# Patient Record
Sex: FEMALE | Race: BLACK OR AFRICAN AMERICAN | NOT HISPANIC OR LATINO | URBAN - METROPOLITAN AREA
[De-identification: names, ages, dates, MRNs, and addresses within clinical notes are randomized per-mention and may not be internally consistent; named-entity substitution may affect disease eponyms.]

---

## 2018-12-11 ENCOUNTER — HISTORICAL (OUTPATIENT)
Dept: RADIOLOGY | Facility: HOSPITAL | Age: 33
End: 2018-12-11

## 2019-03-06 ENCOUNTER — HISTORICAL (OUTPATIENT)
Dept: INTERNAL MEDICINE | Facility: CLINIC | Age: 34
End: 2019-03-06

## 2019-03-06 LAB
ABS NEUT (OLG): 2.89 X10(3)/MCL (ref 2.1–9.2)
ALBUMIN SERPL-MCNC: 4.5 GM/DL (ref 3.4–5)
ALBUMIN/GLOB SERPL: 1.4 RATIO (ref 1.1–2)
ALP SERPL-CCNC: 121 UNIT/L (ref 45–117)
ALT SERPL-CCNC: 17 UNIT/L (ref 12–78)
APPEARANCE, UA: ABNORMAL
AST SERPL-CCNC: 10 UNIT/L (ref 15–37)
B-HCG SERPL QL: NEGATIVE
BACTERIA #/AREA URNS AUTO: ABNORMAL /[HPF]
BASOPHILS # BLD AUTO: 0.01 X10(3)/MCL
BASOPHILS NFR BLD AUTO: 0 %
BILIRUB SERPL-MCNC: 0.7 MG/DL (ref 0.2–1)
BILIRUB UR QL STRIP: NEGATIVE
BILIRUBIN DIRECT+TOT PNL SERPL-MCNC: 0.2 MG/DL
BILIRUBIN DIRECT+TOT PNL SERPL-MCNC: 0.5 MG/DL
BUN SERPL-MCNC: 11 MG/DL (ref 7–18)
CALCIUM SERPL-MCNC: 9.2 MG/DL (ref 8.5–10.1)
CHLORIDE SERPL-SCNC: 102 MMOL/L (ref 98–107)
CO2 SERPL-SCNC: 30 MMOL/L (ref 21–32)
COLOR UR: YELLOW
CREAT SERPL-MCNC: 0.7 MG/DL (ref 0.6–1.3)
EOSINOPHIL # BLD AUTO: 0.13 X10(3)/MCL
EOSINOPHIL NFR BLD AUTO: 2 %
ERYTHROCYTE [DISTWIDTH] IN BLOOD BY AUTOMATED COUNT: 12.8 % (ref 11.5–14.5)
GLOBULIN SER-MCNC: 3.2 GM/ML (ref 2.3–3.5)
GLUCOSE (UA): NORMAL
GLUCOSE SERPL-MCNC: 92 MG/DL (ref 74–106)
HCT VFR BLD AUTO: 35.2 % (ref 35–46)
HGB BLD-MCNC: 11.5 GM/DL (ref 12–16)
HGB UR QL STRIP: NEGATIVE
HYALINE CASTS #/AREA URNS LPF: ABNORMAL /[LPF]
IMM GRANULOCYTES # BLD AUTO: 0.01 10*3/UL
IMM GRANULOCYTES NFR BLD AUTO: 0 %
KETONES UR QL STRIP: NEGATIVE
LEUKOCYTE ESTERASE UR QL STRIP: NEGATIVE
LYMPHOCYTES # BLD AUTO: 3.34 X10(3)/MCL
LYMPHOCYTES NFR BLD AUTO: 48 % (ref 13–40)
MCH RBC QN AUTO: 29.5 PG (ref 26–34)
MCHC RBC AUTO-ENTMCNC: 32.7 GM/DL (ref 31–37)
MCV RBC AUTO: 90.3 FL (ref 80–100)
MONOCYTES # BLD AUTO: 0.61 X10(3)/MCL
MONOCYTES NFR BLD AUTO: 9 % (ref 4–12)
NEUTROPHILS # BLD AUTO: 2.89 X10(3)/MCL
NEUTROPHILS NFR BLD AUTO: 41 X10(3)/MCL
NITRITE UR QL STRIP: NEGATIVE
PH UR STRIP: 7.5 [PH] (ref 4.5–8)
PLATELET # BLD AUTO: 373 X10(3)/MCL (ref 130–400)
PMV BLD AUTO: 9.9 FL (ref 7.4–10.4)
POTASSIUM SERPL-SCNC: 3.3 MMOL/L (ref 3.5–5.1)
PROT SERPL-MCNC: 7.7 GM/DL (ref 6.4–8.2)
PROT UR QL STRIP: 10 MG/DL
RBC # BLD AUTO: 3.9 X10(6)/MCL (ref 4–5.2)
RBC #/AREA URNS AUTO: ABNORMAL /[HPF]
SODIUM SERPL-SCNC: 139 MMOL/L (ref 136–145)
SP GR UR STRIP: 1.02 (ref 1–1.03)
SQUAMOUS #/AREA URNS LPF: ABNORMAL /[LPF]
T4 FREE SERPL-MCNC: 0.85 NG/DL (ref 0.76–1.46)
T4 SERPL-MCNC: 8.1 MCG/DL (ref 4.8–13.9)
TSH SERPL-ACNC: 0.64 MIU/L (ref 0.36–3.74)
UROBILINOGEN UR STRIP-ACNC: NORMAL
WBC # SPEC AUTO: 7 X10(3)/MCL (ref 4.5–11)
WBC #/AREA URNS AUTO: ABNORMAL /HPF

## 2019-09-17 ENCOUNTER — HISTORICAL (OUTPATIENT)
Dept: ADMINISTRATIVE | Facility: HOSPITAL | Age: 34
End: 2019-09-17

## 2019-09-17 LAB
ALBUMIN SERPL-MCNC: 4.5 GM/DL (ref 3.4–5)
ALBUMIN/GLOB SERPL: 1.2 RATIO (ref 1.1–2)
ALP SERPL-CCNC: 89 UNIT/L (ref 45–117)
ALT SERPL-CCNC: 25 UNIT/L (ref 12–78)
AST SERPL-CCNC: 17 UNIT/L (ref 15–37)
BILIRUB SERPL-MCNC: 0.8 MG/DL (ref 0.2–1)
BILIRUBIN DIRECT+TOT PNL SERPL-MCNC: 0.2 MG/DL (ref 0–0.2)
BILIRUBIN DIRECT+TOT PNL SERPL-MCNC: 0.6 MG/DL
BUN SERPL-MCNC: 18 MG/DL (ref 7–18)
CALCIUM SERPL-MCNC: 8.9 MG/DL (ref 8.5–10.1)
CHLORIDE SERPL-SCNC: 103 MMOL/L (ref 98–107)
CO2 SERPL-SCNC: 26 MMOL/L (ref 21–32)
CREAT SERPL-MCNC: 0.9 MG/DL (ref 0.6–1.3)
GLOBULIN SER-MCNC: 3.7 GM/ML (ref 2.3–3.5)
GLUCOSE SERPL-MCNC: 86 MG/DL (ref 74–106)
HAV IGM SERPL QL IA: NONREACTIVE
HBV CORE IGM SERPL QL IA: REACTIVE
HBV SURFACE AG SERPL QL IA: NEGATIVE
HCV AB SERPL QL IA: NONREACTIVE
HIV 1+2 AB+HIV1 P24 AG SERPL QL IA: NONREACTIVE
POTASSIUM SERPL-SCNC: 3 MMOL/L (ref 3.5–5.1)
PROT SERPL-MCNC: 8.2 GM/DL (ref 6.4–8.2)
SODIUM SERPL-SCNC: 138 MMOL/L (ref 136–145)
T PALLIDUM AB SER QL: NONREACTIVE

## 2019-12-06 ENCOUNTER — HISTORICAL (OUTPATIENT)
Dept: ADMINISTRATIVE | Facility: HOSPITAL | Age: 34
End: 2019-12-06

## 2019-12-09 LAB — FINAL CULTURE: NORMAL

## 2020-02-15 ENCOUNTER — HISTORICAL (OUTPATIENT)
Dept: ADMINISTRATIVE | Facility: HOSPITAL | Age: 35
End: 2020-02-15

## 2020-02-17 LAB — FINAL CULTURE: NORMAL

## 2020-07-29 ENCOUNTER — HISTORICAL (OUTPATIENT)
Dept: INTERNAL MEDICINE | Facility: CLINIC | Age: 35
End: 2020-07-29

## 2020-07-29 LAB
ABS NEUT (OLG): 3.65 X10(3)/MCL (ref 2.1–9.2)
ALBUMIN SERPL-MCNC: 4 GM/DL (ref 3.4–5)
ALBUMIN/GLOB SERPL: 1 RATIO (ref 1.1–2)
ALP SERPL-CCNC: 103 UNIT/L (ref 45–117)
ALT SERPL-CCNC: 44 UNIT/L (ref 12–78)
AST SERPL-CCNC: 18 UNIT/L (ref 15–37)
BASOPHILS # BLD AUTO: 0 X10(3)/MCL (ref 0–0.2)
BASOPHILS NFR BLD AUTO: 0 %
BILIRUB SERPL-MCNC: 0.4 MG/DL (ref 0.2–1)
BILIRUBIN DIRECT+TOT PNL SERPL-MCNC: 0.2 MG/DL
BILIRUBIN DIRECT+TOT PNL SERPL-MCNC: 0.2 MG/DL (ref 0–0.2)
BUN SERPL-MCNC: 12 MG/DL (ref 7–18)
CALCIUM SERPL-MCNC: 8.5 MG/DL (ref 8.5–10.1)
CHLORIDE SERPL-SCNC: 105 MMOL/L (ref 98–107)
CHOLEST SERPL-MCNC: 190 MG/DL
CHOLEST/HDLC SERPL: 2.7 {RATIO} (ref 0–4.4)
CO2 SERPL-SCNC: 25 MMOL/L (ref 21–32)
CREAT SERPL-MCNC: 0.8 MG/DL (ref 0.6–1.3)
DEPRECATED CALCIDIOL+CALCIFEROL SERPL-MC: 21.6 NG/ML (ref 30–80)
EOSINOPHIL # BLD AUTO: 0.1 X10(3)/MCL (ref 0–0.9)
EOSINOPHIL NFR BLD AUTO: 2 %
ERYTHROCYTE [DISTWIDTH] IN BLOOD BY AUTOMATED COUNT: 13 % (ref 11.5–14.5)
EST. AVERAGE GLUCOSE BLD GHB EST-MCNC: 103 MG/DL
FERRITIN SERPL-MCNC: 18.7 NG/ML (ref 10–150)
GLOBULIN SER-MCNC: 4 GM/ML (ref 2.3–3.5)
GLUCOSE SERPL-MCNC: 107 MG/DL (ref 74–106)
HBA1C MFR BLD: 5.2 % (ref 4.2–6.3)
HCT VFR BLD AUTO: 35.4 % (ref 35–46)
HDLC SERPL-MCNC: 70 MG/DL (ref 40–59)
HGB BLD-MCNC: 11.7 GM/DL (ref 12–16)
IMM GRANULOCYTES # BLD AUTO: 0.02 10*3/UL
IMM GRANULOCYTES NFR BLD AUTO: 0 %
IRON SATN MFR SERPL: 11.9 % (ref 15–50)
IRON SERPL-MCNC: 51 MCG/DL (ref 50–170)
LDLC SERPL CALC-MCNC: 94 MG/DL
LYMPHOCYTES # BLD AUTO: 3 X10(3)/MCL (ref 0.6–4.6)
LYMPHOCYTES NFR BLD AUTO: 40 %
MCH RBC QN AUTO: 29.7 PG (ref 26–34)
MCHC RBC AUTO-ENTMCNC: 33.1 GM/DL (ref 31–37)
MCV RBC AUTO: 89.8 FL (ref 80–100)
MONOCYTES # BLD AUTO: 0.6 X10(3)/MCL (ref 0.1–1.3)
MONOCYTES NFR BLD AUTO: 8 %
NEUTROPHILS # BLD AUTO: 3.65 X10(3)/MCL (ref 2.1–9.2)
NEUTROPHILS NFR BLD AUTO: 49 %
PLATELET # BLD AUTO: 382 X10(3)/MCL (ref 130–400)
PMV BLD AUTO: 10.4 FL (ref 7.4–10.4)
POTASSIUM SERPL-SCNC: 3.1 MMOL/L (ref 3.5–5.1)
PROT SERPL-MCNC: 8 GM/DL (ref 6.4–8.2)
RBC # BLD AUTO: 3.94 X10(6)/MCL (ref 4–5.2)
SODIUM SERPL-SCNC: 136 MMOL/L (ref 136–145)
T4 FREE SERPL-MCNC: 0.88 NG/DL (ref 0.76–1.46)
TIBC SERPL-MCNC: 429 MCG/DL (ref 250–450)
TRANSFERRIN SERPL-MCNC: 328 MG/DL (ref 200–360)
TRIGL SERPL-MCNC: 129 MG/DL
TSH SERPL-ACNC: 1.37 MIU/L (ref 0.36–3.74)
VLDLC SERPL CALC-MCNC: 26 MG/DL
WBC # SPEC AUTO: 7.4 X10(3)/MCL (ref 4.5–11)

## 2020-11-05 ENCOUNTER — HISTORICAL (OUTPATIENT)
Dept: ADMINISTRATIVE | Facility: HOSPITAL | Age: 35
End: 2020-11-05

## 2020-11-05 LAB
HAV IGM SERPL QL IA: NONREACTIVE
HBV CORE IGM SERPL QL IA: NONREACTIVE
HBV SURFACE AG SERPL QL IA: NONREACTIVE
HCV AB SERPL QL IA: NONREACTIVE
HIV 1+2 AB+HIV1 P24 AG SERPL QL IA: NONREACTIVE
T PALLIDUM AB SER QL: NONREACTIVE

## 2020-11-09 LAB
HUMAN PAPILLOMAVIRUS (HPV): NORMAL
PAP RECOMMENDATION EXT: NORMAL
PAP SMEAR: NORMAL

## 2021-01-29 ENCOUNTER — HISTORICAL (OUTPATIENT)
Dept: INTERNAL MEDICINE | Facility: CLINIC | Age: 36
End: 2021-01-29

## 2021-01-29 LAB
ABS NEUT (OLG): 3.48 X10(3)/MCL (ref 2.1–9.2)
ALBUMIN SERPL-MCNC: 4.1 GM/DL (ref 3.5–5)
ALBUMIN/GLOB SERPL: 1.3 RATIO (ref 1.1–2)
ALP SERPL-CCNC: 97 UNIT/L (ref 40–150)
ALT SERPL-CCNC: 40 UNIT/L (ref 0–55)
AST SERPL-CCNC: 27 UNIT/L (ref 5–34)
BASOPHILS # BLD AUTO: 0 X10(3)/MCL (ref 0–0.2)
BASOPHILS NFR BLD AUTO: 0 %
BILIRUB SERPL-MCNC: 0.7 MG/DL
BILIRUBIN DIRECT+TOT PNL SERPL-MCNC: 0.3 MG/DL (ref 0–0.5)
BILIRUBIN DIRECT+TOT PNL SERPL-MCNC: 0.4 MG/DL (ref 0–0.8)
BUN SERPL-MCNC: 11 MG/DL (ref 7–18.7)
CALCIUM SERPL-MCNC: 8.7 MG/DL (ref 8.4–10.2)
CHLORIDE SERPL-SCNC: 103 MMOL/L (ref 98–107)
CHOLEST SERPL-MCNC: 177 MG/DL
CHOLEST/HDLC SERPL: 3 {RATIO} (ref 0–5)
CO2 SERPL-SCNC: 27 MMOL/L (ref 22–29)
CREAT SERPL-MCNC: 0.75 MG/DL (ref 0.55–1.02)
DEPRECATED CALCIDIOL+CALCIFEROL SERPL-MC: 53.5 NG/ML (ref 30–80)
EOSINOPHIL # BLD AUTO: 0.1 X10(3)/MCL (ref 0–0.9)
EOSINOPHIL NFR BLD AUTO: 2 %
ERYTHROCYTE [DISTWIDTH] IN BLOOD BY AUTOMATED COUNT: 13.6 % (ref 11.5–14.5)
EST. AVERAGE GLUCOSE BLD GHB EST-MCNC: 99.7 MG/DL
GLOBULIN SER-MCNC: 3.2 GM/DL (ref 2.4–3.5)
GLUCOSE SERPL-MCNC: 100 MG/DL (ref 74–100)
HBA1C MFR BLD: 5.1 %
HCT VFR BLD AUTO: 35.7 % (ref 35–46)
HDLC SERPL-MCNC: 70 MG/DL (ref 35–60)
HGB BLD-MCNC: 11.7 GM/DL (ref 12–16)
IMM GRANULOCYTES # BLD AUTO: 0.01 10*3/UL
IMM GRANULOCYTES NFR BLD AUTO: 0 %
LDLC SERPL CALC-MCNC: 89 MG/DL (ref 50–140)
LYMPHOCYTES # BLD AUTO: 2.1 X10(3)/MCL (ref 0.6–4.6)
LYMPHOCYTES NFR BLD AUTO: 33 %
MCH RBC QN AUTO: 30.2 PG (ref 26–34)
MCHC RBC AUTO-ENTMCNC: 32.8 GM/DL (ref 31–37)
MCV RBC AUTO: 92.2 FL (ref 80–100)
MONOCYTES # BLD AUTO: 0.6 X10(3)/MCL (ref 0.1–1.3)
MONOCYTES NFR BLD AUTO: 9 %
NEUTROPHILS # BLD AUTO: 3.48 X10(3)/MCL (ref 2.1–9.2)
NEUTROPHILS NFR BLD AUTO: 55 %
PLATELET # BLD AUTO: 343 X10(3)/MCL (ref 130–400)
PMV BLD AUTO: 9.8 FL (ref 7.4–10.4)
POTASSIUM SERPL-SCNC: 3.6 MMOL/L (ref 3.5–5.1)
PROT SERPL-MCNC: 7.3 GM/DL (ref 6.4–8.3)
RBC # BLD AUTO: 3.87 X10(6)/MCL (ref 4–5.2)
SODIUM SERPL-SCNC: 139 MMOL/L (ref 136–145)
TRIGL SERPL-MCNC: 88 MG/DL (ref 37–140)
TSH SERPL-ACNC: 0.41 UIU/ML (ref 0.35–4.94)
VLDLC SERPL CALC-MCNC: 18 MG/DL
WBC # SPEC AUTO: 6.3 X10(3)/MCL (ref 4.5–11)

## 2021-07-29 ENCOUNTER — HISTORICAL (OUTPATIENT)
Dept: INTERNAL MEDICINE | Facility: CLINIC | Age: 36
End: 2021-07-29

## 2021-07-29 ENCOUNTER — HISTORICAL (OUTPATIENT)
Dept: ADMINISTRATIVE | Facility: HOSPITAL | Age: 36
End: 2021-07-29

## 2021-07-29 LAB
ABS NEUT (OLG): 3.89 X10(3)/MCL (ref 2.1–9.2)
ALBUMIN SERPL-MCNC: 4.1 GM/DL (ref 3.5–5)
ALBUMIN/GLOB SERPL: 1.3 RATIO (ref 1.1–2)
ALP SERPL-CCNC: 87 UNIT/L (ref 40–150)
ALT SERPL-CCNC: 20 UNIT/L (ref 0–55)
APPEARANCE, UA: CLEAR
AST SERPL-CCNC: 16 UNIT/L (ref 5–34)
BACTERIA #/AREA URNS AUTO: ABNORMAL /HPF
BASOPHILS # BLD AUTO: 0 X10(3)/MCL (ref 0–0.2)
BASOPHILS NFR BLD AUTO: 0 %
BILIRUB SERPL-MCNC: 0.4 MG/DL
BILIRUB UR QL STRIP: NEGATIVE
BILIRUBIN DIRECT+TOT PNL SERPL-MCNC: 0.1 MG/DL (ref 0–0.5)
BILIRUBIN DIRECT+TOT PNL SERPL-MCNC: 0.3 MG/DL (ref 0–0.8)
BUN SERPL-MCNC: 10.9 MG/DL (ref 7–18.7)
CALCIUM SERPL-MCNC: 8.6 MG/DL (ref 8.4–10.2)
CHLORIDE SERPL-SCNC: 106 MMOL/L (ref 98–107)
CHOLEST SERPL-MCNC: 201 MG/DL
CHOLEST/HDLC SERPL: 3 {RATIO} (ref 0–5)
CO2 SERPL-SCNC: 28 MMOL/L (ref 22–29)
COLOR UR: ABNORMAL
CREAT SERPL-MCNC: 0.73 MG/DL (ref 0.55–1.02)
DEPRECATED CALCIDIOL+CALCIFEROL SERPL-MC: 38.5 NG/ML (ref 30–80)
EOSINOPHIL # BLD AUTO: 0.1 X10(3)/MCL (ref 0–0.9)
EOSINOPHIL NFR BLD AUTO: 1 %
ERYTHROCYTE [DISTWIDTH] IN BLOOD BY AUTOMATED COUNT: 13 % (ref 11.5–14.5)
GLOBULIN SER-MCNC: 3.1 GM/DL (ref 2.4–3.5)
GLUCOSE (UA): NEGATIVE
GLUCOSE SERPL-MCNC: 89 MG/DL (ref 74–100)
HCT VFR BLD AUTO: 34.4 % (ref 35–46)
HDLC SERPL-MCNC: 65 MG/DL (ref 35–60)
HGB BLD-MCNC: 11.4 GM/DL (ref 12–16)
HGB UR QL STRIP: NEGATIVE
HYALINE CASTS #/AREA URNS LPF: ABNORMAL /LPF
IMM GRANULOCYTES # BLD AUTO: 0.01 10*3/UL
IMM GRANULOCYTES NFR BLD AUTO: 0 %
KETONES UR QL STRIP: NEGATIVE
LDLC SERPL CALC-MCNC: 120 MG/DL (ref 50–140)
LEUKOCYTE ESTERASE UR QL STRIP: 500 LEU/UL
LYMPHOCYTES # BLD AUTO: 2.6 X10(3)/MCL (ref 0.6–4.6)
LYMPHOCYTES NFR BLD AUTO: 37 %
MCH RBC QN AUTO: 30.4 PG (ref 26–34)
MCHC RBC AUTO-ENTMCNC: 33.1 GM/DL (ref 31–37)
MCV RBC AUTO: 91.7 FL (ref 80–100)
MONOCYTES # BLD AUTO: 0.6 X10(3)/MCL (ref 0.1–1.3)
MONOCYTES NFR BLD AUTO: 8 %
NEUTROPHILS # BLD AUTO: 3.89 X10(3)/MCL (ref 2.1–9.2)
NEUTROPHILS NFR BLD AUTO: 54 %
NITRITE UR QL STRIP: NEGATIVE
NRBC BLD AUTO-RTO: 0 % (ref 0–0.2)
PH UR STRIP: 7 [PH] (ref 4.5–8)
PLATELET # BLD AUTO: 347 X10(3)/MCL (ref 130–400)
PMV BLD AUTO: 9.8 FL (ref 7.4–10.4)
POTASSIUM SERPL-SCNC: 3.5 MMOL/L (ref 3.5–5.1)
PROT SERPL-MCNC: 7.2 GM/DL (ref 6.4–8.3)
PROT UR QL STRIP: NEGATIVE
RBC # BLD AUTO: 3.75 X10(6)/MCL (ref 4–5.2)
RBC #/AREA URNS AUTO: ABNORMAL /HPF
SODIUM SERPL-SCNC: 139 MMOL/L (ref 136–145)
SP GR UR STRIP: 1.01 (ref 1–1.03)
SQUAMOUS #/AREA URNS LPF: >100 /LPF
T4 FREE SERPL-MCNC: 0.8 NG/DL (ref 0.7–1.48)
TRIGL SERPL-MCNC: 81 MG/DL (ref 37–140)
TSH SERPL-ACNC: 0.42 UIU/ML (ref 0.35–4.94)
UROBILINOGEN UR STRIP-ACNC: NORMAL
VLDLC SERPL CALC-MCNC: 16 MG/DL
WBC # SPEC AUTO: 7.2 X10(3)/MCL (ref 4.5–11)
WBC #/AREA URNS AUTO: ABNORMAL /HPF

## 2021-07-31 LAB — FINAL CULTURE: NO GROWTH

## 2021-10-23 ENCOUNTER — HISTORICAL (OUTPATIENT)
Dept: ADMINISTRATIVE | Facility: HOSPITAL | Age: 36
End: 2021-10-23

## 2021-10-23 LAB — SARS-COV-2 RNA RESP QL NAA+PROBE: NEGATIVE

## 2021-10-25 LAB — FINAL CULTURE: NORMAL

## 2022-03-28 ENCOUNTER — HISTORICAL (OUTPATIENT)
Dept: INTERNAL MEDICINE | Facility: CLINIC | Age: 37
End: 2022-03-28

## 2022-03-28 LAB
ABS NEUT (OLG): 3.5 (ref 2.1–9.2)
ALBUMIN SERPL-MCNC: 4.1 G/DL (ref 3.5–5)
ALBUMIN/GLOB SERPL: 1.2 {RATIO} (ref 1.1–2)
ALP SERPL-CCNC: 103 U/L (ref 40–150)
ALT SERPL-CCNC: 27 U/L (ref 0–55)
AST SERPL-CCNC: 18 U/L (ref 5–34)
BASOPHILS # BLD AUTO: 0 10*3/UL (ref 0–0.2)
BASOPHILS NFR BLD AUTO: 0 %
BILIRUB SERPL-MCNC: 0.9 MG/DL
BILIRUBIN DIRECT+TOT PNL SERPL-MCNC: 0.3 (ref 0–0.5)
BILIRUBIN DIRECT+TOT PNL SERPL-MCNC: 0.6 (ref 0–0.8)
BUN SERPL-MCNC: 9.1 MG/DL (ref 7–18.7)
CALCIUM SERPL-MCNC: 8.9 MG/DL (ref 8.7–10.5)
CHLORIDE SERPL-SCNC: 102 MMOL/L (ref 98–107)
CHOLEST SERPL-MCNC: 195 MG/DL
CHOLEST/HDLC SERPL: 3 {RATIO} (ref 0–5)
CO2 SERPL-SCNC: 28 MMOL/L (ref 22–29)
CREAT SERPL-MCNC: 0.75 MG/DL (ref 0.55–1.02)
EOSINOPHIL # BLD AUTO: 0.1 10*3/UL (ref 0–0.9)
EOSINOPHIL NFR BLD AUTO: 1 %
ERYTHROCYTE [DISTWIDTH] IN BLOOD BY AUTOMATED COUNT: 13.5 % (ref 11.5–14.5)
FLAG2 (OHS): 70
FLAG3 (OHS): 80
FLAGS (OHS): 80
GLOBULIN SER-MCNC: 3.3 G/DL (ref 2.4–3.5)
GLUCOSE SERPL-MCNC: 103 MG/DL (ref 74–100)
HCT VFR BLD AUTO: 35.6 % (ref 35–46)
HDLC SERPL-MCNC: 70 MG/DL (ref 35–60)
HEMOLYSIS INTERF INDEX SERPL-ACNC: 4
HGB BLD-MCNC: 11.5 G/DL (ref 12–16)
ICTERIC INTERF INDEX SERPL-ACNC: 1
IMM GRANULOCYTES # BLD AUTO: 0.01 10*3/UL
IMM GRANULOCYTES NFR BLD AUTO: 0 %
LDLC SERPL CALC-MCNC: 106 MG/DL (ref 50–140)
LIPEMIC INTERF INDEX SERPL-ACNC: 8
LOW EVENT # SUSPECT FLAG (OHS): 80
LYMPHOCYTES # BLD AUTO: 2.8 10*3/UL (ref 0.6–4.6)
LYMPHOCYTES NFR BLD AUTO: 41 %
MANUAL DIFF? (OHS): NO
MCH RBC QN AUTO: 29.6 PG (ref 26–34)
MCHC RBC AUTO-ENTMCNC: 32.3 G/DL (ref 31–37)
MCV RBC AUTO: 91.8 FL (ref 80–100)
MO BLASTS SUSPECT FLAG (OHS): 40
MONOCYTES # BLD AUTO: 0.5 10*3/UL (ref 0.1–1.3)
MONOCYTES NFR BLD AUTO: 7 %
NEUTROPHILS # BLD AUTO: 3.5 10*3/UL (ref 2.1–9.2)
NEUTROPHILS NFR BLD AUTO: 51 %
NRBC BLD AUTO-RTO: 0 % (ref 0–0.2)
PLATELET # BLD AUTO: 370 10*3/UL (ref 130–400)
PLATELET CLUMPS SUSPECT FLAG (OHS): 10
PMV BLD AUTO: 10.1 FL (ref 7.4–10.4)
POTASSIUM SERPL-SCNC: 3.4 MMOL/L (ref 3.5–5.1)
PROT SERPL-MCNC: 7.4 G/DL (ref 6.4–8.3)
RBC # BLD AUTO: 3.88 10*6/UL (ref 4–5.2)
SODIUM SERPL-SCNC: 139 MMOL/L (ref 136–145)
TRIGL SERPL-MCNC: 94 MG/DL (ref 37–140)
VLDLC SERPL CALC-MCNC: 19 MG/DL
WBC # SPEC AUTO: 6.9 10*3/UL (ref 4.5–11)

## 2022-04-10 ENCOUNTER — HISTORICAL (OUTPATIENT)
Dept: ADMINISTRATIVE | Facility: HOSPITAL | Age: 37
End: 2022-04-10

## 2022-04-27 VITALS
DIASTOLIC BLOOD PRESSURE: 93 MMHG | WEIGHT: 135.81 LBS | HEIGHT: 59 IN | BODY MASS INDEX: 27.38 KG/M2 | OXYGEN SATURATION: 100 % | SYSTOLIC BLOOD PRESSURE: 133 MMHG

## 2022-05-02 ENCOUNTER — PATIENT MESSAGE (OUTPATIENT)
Dept: INTERNAL MEDICINE | Facility: CLINIC | Age: 37
End: 2022-05-02

## 2022-05-04 NOTE — HISTORICAL [facility] CERNER
This is a historical note converted from [place]. Formatting and pictures may have been removed.  Please reference [place] for original formatting and attached multimedia. Chief Complaint  right shoulder dull ache at times./ loose bowels at times.  History of Present Illness  33 yo?AAF here today to f/u via telemed. PMH includes HTN, depression, chronic headaches, uterine fibroids, AUB, anemia, and?tobacco use (1 weekly).?  ?  ?   Cervical Cancer Screening?-?20 PAP  Osteoporosis Screening?-?Vitamin D Level 53.5  STI Screening - 2020  ?   Todays Visit:  This is a telemedicine note.? I have reviewed the patients name:?JARAD Lovell:?1985. ?I verbally reviewed the past medical history, active medication list, and allergy?list with the patient?and verified with a  and/or picture ID, if applicable. ?I have verified that this patient is in the state of [place]. ?The patient has consented to be treated remotely by telemedicine appointment via [place] Department of Health approved?interactive?audio?format. The?patient?does not have?access to a working?audiovisualformat.? The patient stated that they understood and accepted the privacy and security risks to their information at their location.  Originating site?(where the patient is located):?Patient Home  Distant site (where the provider is located):?Provider Home  ?   Time spent with the patient exceeds?15 minutes 34770?minutes, over 50% of which was used for education and counseling regarding medical conditions,?current medications?including risk/benefit?and side effects/adverse events,?OTC?uses/doses,?home self care?and contact precautions,?and red flags and indications for immediate medical attention. ?The patient is receptive, expresses understanding, and is agreeable to plan. ?All questions answered.  ?   Start time: 0845  End time: 0900  ?   Pt reported intermittent diarrhea for the last month, reports applesauce consistency, abdominal  pain every now and then,?Imodiumworks sometimes, agreeable to stool studies today. Reports right shoulder pain since May, unsure if she injured it or not, OTC Tylenol with no relief, agreeable to shoulder x-ray today. Denies any other acute issues today.  of breath,?cough, fever, headache,?dizziness, weakness, abdominal pain, nausea,?vomiting, diarrhea, constipation, black/bloody stools, unplanned weight loss, night sweats, changes in urinary patterns, burning/odor with urination, depression, anxiety, and SI/HI.?  Review of Systems  Constitutional:?no fever, fatigue, weakness  Eye:?no vision loss, eye redness, drainage, or pain  ENMT:?no sore throat, ear pain, sinus pain/congestion, nasal congestion/drainage  Respiratory:?no cough, no wheezing, no shortness of breath  Cardiovascular:?no chest pain, no palpitations, no edema  Gastrointestinal:?no nausea, vomiting, or diarrhea. No abdominal pain  Genitourinary:?no dysuria, no urinary frequency or urgency, no hematuria  Hema/Lymph:?no abnormal bruising or bleeding  Endocrine:?no heat or cold intolerance, no excessive thirst or excessive urination  Musculoskeletal:?no muscle or joint pain, no joint swelling  Integumentary:?no skin rash or abnormal lesion  Neurologic: no headache, no dizziness, no weakness or numbness  Physical Exam  General:?Alert and oriented,?no acute distress  HEENT:?Able to hear telephonic visit?at a normal tone of voice  Respiratory:?Able to speak in full sentences, no coughing  Neurologic: No issues with articulation  Psych: Calm, cooperative  Assessment/Plan  1.?Acute diarrhea?R19.7  Stool Studies ordered today  Continue Imodium  Increase PO Fluids  Ordered:  1160F- Medication reconciliation completed during visit, Acute diarrhea  Right shoulder pain  Hypertension  Depression  Hypokalemia  Headache, tension type, chronic  Tobacco user, [facility], 07/29/21 9:03:00 CDT  Clinic Follow up, *Est. 01/29/22 3:00:00 CST, Order for  future visit, Acute diarrhea  Right shoulder pain  Hypertension  Depression  Hypokalemia  Headache, tension type, chronic  Tobacco user, [facility]  Clostridium Difficile Toxin Assay Stool, Routine collect, *Est. 08/12/21 3:00:00 CDT, Stool Clostrium difficile, Order for future visit, *Est. Stop date 08/12/21 3:00:00 CDT, Nurse collect, Acute diarrhea, 07/29/21 8:54:00 CDT  H. pylori Stool Ag, EIA-[facility] [id number], Routine collect, *Est. 08/12/21 3:00:00 CDT, Stool, Order for future visit, *Est. Stop date 08/12/21 3:00:00 CDT, Nurse collect, Acute diarrhea, 07/29/21 8:53:00 CDT  Rotavirus Antigen Stool, Routine collect, *Est. 08/12/21 3:00:00 CDT, Stool, Order for future visit, *Est. Stop date 08/12/21 3:00:00 CDT, Nurse collect, Acute diarrhea, 07/29/21 8:55:00 CDT  Stool Culture, Routine collect, *Est. 08/12/21 3:00:00 CDT, Order for future visit, Stool, Nurse collect, *Est. Stop date 08/12/21 3:00:00 CDT, Acute diarrhea  Telemed Visit Level Est Patient Level 3 41218-05 PC, Acute diarrhea  Right shoulder pain  Hypertension  Depression  Hypokalemia  Headache, tension type, chronic  Tobacco user, [facility], 07/29/21 9:03:00 CDT  ?  2.?Right shoulder pain?M25.511  Right Shoulder X-ray Today  Ordered:  1160F- Medication reconciliation completed during visit, Acute diarrhea  Right shoulder pain  Hypertension  Depression  Hypokalemia  Headache, tension type, chronic  Tobacco user, [facility], 07/29/21 9:03:00 CDT  Clinic Follow up, *Est. 01/29/22 3:00:00 CST, Order for future visit, Acute diarrhea  Right shoulder pain  Hypertension  Depression  Hypokalemia  Headache, tension type, chronic  Tobacco user, [facility]  Telemed Visit Level Est Patient Level 3 66197-79 PC, Acute diarrhea  Right shoulder pain  Hypertension  Depression  Hypokalemia  Headache, tension type, chronic  Tobacco user, [facility], 07/29/21 9:03:00  CDT  XR Shoulder Right Minimum 2 Views, Routine, *Est. 07/29/21 3:00:00 CDT, None, Ambulatory, Rad Type, Order for future visit, Right shoulder pain, Not Scheduled, *Est. 07/29/21 3:00:00 CDT  ?  3.?Hypertension?I10  Continue medications as prescribed  Low Sodium Diet (Dash Diet - less than 2 grams of sodium per day).  Monitor Blood Pressure daily and log. Report any consistent numbers greater than 140/90.  Smoking Cessation encouraged to aid in BP reduction.  Maintain healthy weight with goal BMI <30. Exercise 30 minutes per day 5 days per week  Ordered:  amLODIPine, 2.5 mg = 1 tab(s), Oral, Daily, Take 1 tab daily for blood pressure, # 90 tab(s), 2 Refill(s), Pharmacy: [id number], 150, cm, Height/Length Dosing, 02/05/21 9:04:00 CST, 62, kg, Weight Dosing, 02/05/21 9:04:00 CST  1160F- Medication reconciliation completed during visit, Acute diarrhea  Right shoulder pain  Hypertension  Depression  Hypokalemia  Headache, tension type, chronic  Tobacco user, [facility] Internal Med Service, 07/29/21 9:03:00 CDT  CBC w/ Auto Diff, Routine collect, *Est. 01/29/22 3:00:00 CST, Blood, Order for future visit, *Est. Stop date 01/29/22 3:00:00 CST, Lab Collect, Hypertension, 07/29/21 9:03:00 CDT  Clinic Follow up, *Est. 01/29/22 3:00:00 CST, Order for future visit, Acute diarrhea  Right shoulder pain  Hypertension  Depression  Hypokalemia  Headache, tension type, chronic  Tobacco user, [facility] Internal Med Service  Comprehensive Metabolic Panel, Routine collect, *Est. 01/29/22 3:00:00 CST, Blood, Order for future visit, *Est. Stop date 01/29/22 3:00:00 CST, Lab Collect, Hypertension, 07/29/21 9:03:00 CDT  Lipid Panel, Routine collect, *Est. 01/29/22 3:00:00 CST, Blood, Order for future visit, *Est. Stop date 01/29/22 3:00:00 CST, Lab Collect, Hypertension, 07/29/21 9:03:00 CDT  Telemed Visit Level Est Patient Level 3 91308-83 PC, Acute diarrhea  Right shoulder pain  Hypertension  Depression  Hypokalemia   Headache, tension type, chronic  Tobacco user, [facility] Internal Med Service, 07/29/21 9:03:00 CDT  Urinalysis with Microscopic if Indicated, Routine collect, Urine, Order for future visit, *Est. 01/29/22 3:00:00 CST, *Est. Stop date 01/29/22 3:00:00 CST, Nurse collect, Hypertension  ?  4.?Depression?F32.9  Continue medications as prescribed  Read positive daily meditations, avoid negative?media, set healthy boundaries.  Exercise daily, keep consistent sleep pattern, eat?a healthy diet.  Establish good social support, make changes to reduce stress.  Do not drink alcohol or use illicit drugs.  Reports any symptoms of suicidal/homicidal ideations or self?harm?immediately, go to nearest emergency room.  Ordered:  desvenlafaxine, 100 mg = 1 tab(s), Oral, Daily, Take 1 tab daily for depression, # 90 tab(s), 2 Refill(s), Pharmacy: [id number], 150, cm, Height/Length Dosing, 02/05/21 9:04:00 CST, 62, kg, Weight Dosing, 02/05/21 9:04:00 CST  1160F- Medication reconciliation completed during visit, Acute diarrhea  Right shoulder pain  Hypertension  Depression  Hypokalemia  Headache, tension type, chronic  Tobacco user, [facility] Internal Med Service, 07/29/21 9:03:00 CDT  Clinic Follow up, *Est. 01/29/22 3:00:00 CST, Order for future visit, Acute diarrhea  Right shoulder pain  Hypertension  Depression  Hypokalemia  Headache, tension type, chronic  Tobacco user, [facility] Internal Med Service  Telemed Visit Level Est Patient Level 3 27420-24 PC, Acute diarrhea  Right shoulder pain  Hypertension  Depression  Hypokalemia  Headache, tension type, chronic  Tobacco user, [facility] Med Service, 07/29/21 9:03:00 CDT  ?  5.?Hypokalemia?E87.6  Continue Potassium Chloride  Discussed Potassium Rich Foods including: Carrots, Broccoli, Potatoes, Beans (dried), Celery, Spinach, Squash, Tomatoes, Avocados, Apricots, Avocado, Cantaloupe, Bananas, Nectarines and Prunes/Figs/Raisins.  Ordered:  potassium chloride, 15 mEq = 1  tab(s), Oral, Daily, Take 1 tab daily for low potassium level, # 90 tab(s), 1 Refill(s), Pharmacy: [id number], 150, cm, Height/Length Dosing, 02/05/21 9:04:00 CST, 62, kg, Weight Dosing, 02/05/21 9:04:00 CST  1160F- Medication reconciliation completed during visit, Acute diarrhea  Right shoulder pain  Hypertension  Depression  Hypokalemia  Headache, tension type, chronic  Tobacco user, [facility] Internal Med Service, 07/29/21 9:03:00 CDT  Clinic Follow up, *Est. 01/29/22 3:00:00 CST, Order for future visit, Acute diarrhea  Right shoulder pain  Hypertension  Depression  Hypokalemia  Headache, tension type, chronic  Tobacco user, [facility] Internal Med Service  Telemed Visit Level Est Patient Level 3 03281-09 PC, Acute diarrhea  Right shoulder pain  Hypertension  Depression  Hypokalemia  Headache, tension type, chronic  Tobacco user, [facility] Internal Med Service, 07/29/21 9:03:00 CDT  ?  6.?Headache, tension type, chronic?G44.229  Avoid triggers such as bright lights, alcohol, nicotine, aged cheeses, chocolate, caffeine, foods/drinks that contain nitrates or aspartame.  Take meds as prescribed.  Lie in a quiet, dark room if possible.  Limit stress and get at least 8 hours of sleep per night.  Ordered:  1160F- Medication reconciliation completed during visit, Acute diarrhea  Right shoulder pain  Hypertension  Depression  Hypokalemia  Headache, tension type, chronic  Tobacco user, [facility], 07/29/21 9:03:00 CDT  Clinic Follow up, *Est. 01/29/22 3:00:00 CST, Order for future visit, Acute diarrhea  Right shoulder pain  Hypertension  Depression  Hypokalemia  Headache, tension type, chronic  Tobacco user, [facility]  Telemed Visit Level Est Patient Level 3 25558-26 PC, Acute diarrhea  Right shoulder pain  Hypertension  Depression  Hypokalemia  Headache, tension type, chronic  Tobacco user, [facility] Service, 07/29/21 9:03:00 CDT  ?  7.?Tobacco  user?Z72.0  Smoking cessation discussed.?  Pt not ready to quit.  Discussed benefits of quitting including improved health, decreased cardiac/vascular/pulmonary/stroke risks as well as saving money  Ordered:  1160F- Medication reconciliation completed during visit, Acute diarrhea  Right shoulder pain  Hypertension  Depression  Hypokalemia  Headache, tension type, chronic  Tobacco user, [facility] Internal Med Service, 07/29/21 9:03:00 CDT  Clinic Follow up, *Est. 01/29/22 3:00:00 CST, Order for future visit, Acute diarrhea  Right shoulder pain  Hypertension  Depression  Hypokalemia  Headache, tension type, chronic  Tobacco user, [facility] Internal Med Service  Telemed Visit Level Est Patient Level 3 69971-01 PC, Acute diarrhea  Right shoulder pain  Hypertension  Depression  Hypokalemia  Headache, tension type, chronic  Tobacco user, [facility] Internal Med Service, 07/29/21 9:03:00 CDT  ?  Orders:  fluticasone nasal, 1 spray(s), Nasal, BID, # 1 bottle(s), 11 Refill(s), Pharmacy: [id number], 150, cm, Height/Length Dosing, 02/05/21 9:04:00 CST, 62, kg, Weight Dosing, 02/05/21 9:04:00 CST  levocetirizine, 5 mg = 1 tab(s), Oral, qPM, # 30 tab(s), 5 Refill(s), Pharmacy: [id number], 150, cm, Height/Length Dosing, 02/05/21 9:04:00 CST, 62, kg, Weight Dosing, 02/05/21 9:04:00 CST  Urine Culture [id number], Routine collect, 07/29/21 8:25:00 CDT, Urine, Collected, Nurse collect, [id number], Stop date 07/29/21 8:25:00 CDT, Wellness examination  Referrals  RTC?prn and?6 months  Medications reviewed & reconciled during visit  Labs reviewed, verbalized understanding  Labs 1 week prior to next appointment  COVID 19 Precautions discussed  ?   Discussed with patient health goals related to [facility]?- Restore, Maintain,?&?Improve Health  Patient Goal this visit:?Improve  ?   Problem List/Past Medical History  Ongoing  Abnormal uterine bleeding (AUB)  Anemia  BMI 24.0-24.9, adult  Depression  Headache,  tension type, chronic  Hypertension  Insomnia  Tachycardia  Tobacco user  Uterine fibroids affecting pregnancy  Historical  Morbid obesity  Procedure/Surgical History  Biopsy of cervix  Cold knife cone biopsy of cervix  ovarian cyst removal  Tonsillectomy and adenoidectomy; age 11 or over   Medications  amLODIPine 2.5 mg oral tablet, 2.5 mg= 1 tab(s), Oral, Daily, 2 refills  Buffered Lidocaine 1% 10mL, 10 mg= 0.5 mL, Subcutaneous, Once  Flonase 50 mcg/inh nasal spray, 1 spray(s), Nasal, BID, 11 refills  potassium chloride 15 mEq oral tablet, extended release, 15 mEq= 1 tab(s), Oral, Daily, 1 refills  Pristiq 100 mg oral tablet, extended release, 100 mg= 1 tab(s), Oral, Daily, 2 refills  Xyzal 5 mg oral tablet, 5 mg= 1 tab(s), Oral, qPM, 5 refills  Allergies  No Known Allergies  No Known Medication Allergies  Social History  Abuse/Neglect  No, No, Yes, 07/29/2021  Alcohol - Medium Risk, 11/04/2014  Current, Wine, 1-2 times per week, 07/29/2021  Employment/School  Employed, 11/05/2020  Exercise  Exercise duration: 30. Exercise frequency: 3-4 times/week. Self assessment: Good condition. Exercise type: Walking, Running., 05/23/2016  Home/Environment  Lives with Alone. Living situation: Home/Independent. Alcohol abuse in household: No. Substance abuse in household: Yes. Smoker in household: No. Injuries/Abuse/Neglect in household: No. Feels unsafe at home: No. Safe place to go: Yes. Agency(s)/Others notified: No. Family/Friends available for support: Yes. Major illness in household: No. Financial concerns: No. TV/Computer concerns: No., 05/23/2016  Nutrition/Health  Regular, Wants to lose weight: No. Sleeping concerns: Yes. Feels highly stressed: No., 05/23/2016  Sexual  Sexually active: Yes. Gender Identity Identifies as female., 11/05/2020  Substance Use - Denies Substance Abuse, 08/06/2014  Never, 03/02/2016  Tobacco - High Risk, 11/04/2014  4 or less cigarettes(less than 1/4 pack)/day in last 30 days, No,  07/29/2021  Family History  Anemia: Mother.  Father: History is negative  Immunizations  Vaccine Date Status   tuberculin purified protein derivative 08/01/2019 Given   tetanus/diphtheria/pertussis, acel(Tdap) 03/06/2019 Given   influenza virus vaccine, inactivated 09/20/2012 Recorded   influenza virus vaccine, inactivated 10/04/2011 Recorded   influenza virus vaccine, inactivated 11/03/2010 Recorded   influenza virus vaccine, inactivated 10/02/2009 Recorded   influenza virus vaccine, inactivated 10/19/2007 Recorded   meningococcal conjugate vaccine 02/16/2006 Recorded   measles/mumps/rubella virus vaccine 07/31/2003 Recorded   Health Maintenance  Health Maintenance  ???Pending?(in the next year)  ???There are no current recommendations pending  ??? ??Due In Future?  ??? ? ? ?Obesity Screening not due until??01/01/22??and every 1??year(s)  ??? ? ? ?Smoking Cessation not due until??01/01/22??and every 1??year(s)  ??? ? ? ?Alcohol Misuse Screening not due until??01/02/22??and every 1??year(s)  ??? ? ? ?Hypertension Management-BMP not due until??01/29/22??and every 1??year(s)  ??? ? ? ?ADL Screening not due until??01/29/22??and every 1??year(s)  ??? ? ? ?Hypertension Management-Education not due until??01/29/22??and every 1??year(s)  ??? ? ? ?Blood Pressure Screening not due until??02/05/22??and every 1??year(s)  ??? ? ? ?Body Mass Index Check not due until??02/05/22??and every 1??year(s)  ??? ? ? ?Hypertension Management-Blood Pressure not due until??02/05/22??and every 1??year(s)  ???Satisfied?(in the past 1 year)  ??? ??Satisfied?  ??? ? ? ?ADL Screening on??01/29/21.??Satisfied by Vicki Abrams LPN  ??? ? ? ?Alcohol Misuse Screening on??01/29/21.??Satisfied by Renu Lopez  ??? ? ? ?Blood Pressure Screening on??02/05/21.??Satisfied by Zayda Galvan  ??? ? ? ?Body Mass Index Check on??02/05/21.??Satisfied by Zayda Galvan  ??? ? ? ?Cervical Cancer Screening  on??11/05/20.??Satisfied by Cale Dewitt  ??? ? ? ?Depression Screening on??07/29/21.??Satisfied by Vicki Abrams LPN  ??? ? ? ?Diabetes Screening on??01/29/21.??Satisfied by Denisha Watson  ??? ? ? ?Hypertension Management-Blood Pressure on??02/05/21.??Satisfied by Zayda Galvan  ??? ? ? ?Hypertension Management-Education on??01/29/21.??Satisfied by Nhan DAVIS-Renu KHOURY  ??? ? ? ?Hypertension Management-BMP on??01/29/21.??Satisfied by Denisha Watson  ??? ? ? ?Influenza Vaccine on??02/05/21.??Satisfied by Zayda Galvan  ??? ? ? ?Obesity Screening on??02/05/21.??Satisfied by Zayda Galvan  ??? ? ? ?Smoking Cessation on??01/29/21.??Satisfied by Nhan DAVIS-CRenu N  ??? ??Refused?  ??? ? ? ?Influenza Vaccine on??01/29/21.??Recorded by Vicki Abrams LPN??Reason: Patient Refuses  ?

## 2022-05-17 ENCOUNTER — TELEPHONE (OUTPATIENT)
Dept: INTERNAL MEDICINE | Facility: CLINIC | Age: 37
End: 2022-05-17

## 2023-01-30 ENCOUNTER — DOCUMENTATION ONLY (OUTPATIENT)
Dept: ADMINISTRATIVE | Facility: HOSPITAL | Age: 38
End: 2023-01-30